# Patient Record
Sex: MALE | Race: BLACK OR AFRICAN AMERICAN | NOT HISPANIC OR LATINO | Employment: FULL TIME | URBAN - METROPOLITAN AREA
[De-identification: names, ages, dates, MRNs, and addresses within clinical notes are randomized per-mention and may not be internally consistent; named-entity substitution may affect disease eponyms.]

---
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
## 2021-07-20 DIAGNOSIS — U07.1 COVID-19 VIRUS DETECTED: ICD-10-CM

## 2021-07-24 ENCOUNTER — NURSE TRIAGE (OUTPATIENT)
Dept: ADMINISTRATIVE | Facility: CLINIC | Age: 21
End: 2021-07-24

## 2022-04-21 PROBLEM — K52.9 GASTROENTERITIS: Status: ACTIVE | Noted: 2022-04-21

## 2022-05-10 PROBLEM — J02.9 VIRAL PHARYNGITIS: Status: ACTIVE | Noted: 2022-05-10

## 2022-05-10 PROBLEM — B34.9 VIRAL SYNDROME: Status: ACTIVE | Noted: 2022-05-10
